# Patient Record
Sex: MALE | Race: WHITE | Employment: OTHER | URBAN - NONMETROPOLITAN AREA
[De-identification: names, ages, dates, MRNs, and addresses within clinical notes are randomized per-mention and may not be internally consistent; named-entity substitution may affect disease eponyms.]

---

## 2024-10-21 ENCOUNTER — TELEPHONE (OUTPATIENT)
Age: 24
End: 2024-10-21

## 2024-10-21 NOTE — TELEPHONE ENCOUNTER
Bertrand Paulino   2000  Lt Wrist fx   Fernando 10/19  Doi 10/19  Xray   Splint and sling   Allyson

## 2024-10-23 DIAGNOSIS — M25.532 PAIN IN LEFT WRIST: Primary | ICD-10-CM

## 2024-10-24 ENCOUNTER — OFFICE VISIT (OUTPATIENT)
Age: 24
End: 2024-10-24

## 2024-10-24 VITALS — BODY MASS INDEX: 33.37 KG/M2 | HEIGHT: 74 IN | WEIGHT: 260 LBS

## 2024-10-24 DIAGNOSIS — S52.572A OTHER CLOSED INTRA-ARTICULAR FRACTURE OF DISTAL END OF LEFT RADIUS, INITIAL ENCOUNTER: Primary | ICD-10-CM

## 2024-10-24 PROBLEM — S52.502A CLOSED FRACTURE OF LEFT DISTAL RADIUS: Status: ACTIVE | Noted: 2024-10-24

## 2024-10-24 RX ORDER — HYDROCODONE BITARTRATE AND ACETAMINOPHEN 5; 325 MG/1; MG/1
TABLET ORAL
COMMUNITY
Start: 2024-10-20

## 2024-10-24 ASSESSMENT — ENCOUNTER SYMPTOMS
BACK PAIN: 0
COLOR CHANGE: 0

## 2024-10-24 NOTE — ASSESSMENT & PLAN NOTE
We will place the patient in a sugar-tong splint overwrapped with fiberglass to limit pronation and supination of the wrist.  We will see him back in 1 week for updated x-rays to ensure that his fracture has not shifted or further displaced.  He is a instructed heavily to not partake in any weight lifting and to limit his activity with this arm.  He does admit that he is going to still try to deer hunt which I advised against.  We will see him back in 1 week.  I did instruct him on proper cast care measures.

## 2024-10-24 NOTE — PROGRESS NOTES
Casting Notes:    Type of cast/splint: Arm , Long Splint Application     Material Used:  1.3 rolls 3 inch cast padding     2.      3.    Fiberglass Cast Tape: 21 sheets 4x15 inch plaster, 2 rolls 3 inch fiberglass cast tape    Reason for Application:     ICD-10-CM    1. Other closed intra-articular fracture of distal end of left radius, initial encounter  S52.572A [place] CAST SUP LONG ARM ADULT FBRG     APPLY LONG ARM CAST      Patient was placed in a sugar tong splint over wrapped with fiberglass with no complications.     Patient was given cast care instructions, and told to call the office with any complaints, or concerns.     Patient was also told to keep their routine follow up appointment.    Derik Cortes MA    
- see patient instructions below.         Electronically signed by PAIGE Myers on 10/24/2024 at 11:15 AM

## 2024-11-04 DIAGNOSIS — S52.572A OTHER CLOSED INTRA-ARTICULAR FRACTURE OF DISTAL END OF LEFT RADIUS, INITIAL ENCOUNTER: Primary | ICD-10-CM

## 2024-12-09 ENCOUNTER — OFFICE VISIT (OUTPATIENT)
Age: 24
End: 2024-12-09
Payer: COMMERCIAL

## 2024-12-09 VITALS — HEIGHT: 74 IN | WEIGHT: 260 LBS | BODY MASS INDEX: 33.37 KG/M2

## 2024-12-09 DIAGNOSIS — S52.572D OTHER CLOSED INTRA-ARTICULAR FRACTURE OF DISTAL END OF LEFT RADIUS WITH ROUTINE HEALING, SUBSEQUENT ENCOUNTER: Primary | ICD-10-CM

## 2024-12-09 PROCEDURE — 99214 OFFICE O/P EST MOD 30 MIN: CPT | Performed by: PHYSICIAN ASSISTANT

## 2024-12-09 PROCEDURE — 29075 APPL CST ELBW FNGR SHORT ARM: CPT | Performed by: PHYSICIAN ASSISTANT

## 2024-12-09 ASSESSMENT — ENCOUNTER SYMPTOMS
BACK PAIN: 0
COLOR CHANGE: 0

## 2024-12-09 NOTE — PROGRESS NOTES
Casting Notes:    Type of cast/splint: Short Leg Cast Application     Material Used:  1. 3 rolls 6 inch cast padding      2.      3.    Fiberglass Cast Tape: 3 rolls 4 inch fiberglass cast tape     Reason for Application:     ICD-10-CM    1. Other closed intra-articular fracture of distal end of left radius with routine healing, subsequent encounter  S52.572D XR WRIST LEFT (MIN 3 VIEWS)     [place] CAST SUP SHT ARM ADULT FBRGL     APPLY FOREARM CAST      Patient was placed in a short leg fiberglass cast with no complications.     Patient was given cast care instructions, and told to call the office with any complaints, or concerns.     Patient was also told to keep their routine follow up appointment.    Derik Cortes MA

## 2024-12-09 NOTE — ASSESSMENT & PLAN NOTE
AP, lateral, oblique x-ray views were obtained today in office of the distal radius fracture with extension into the intra-articular space.  There does appear to be some displacement of the intra-articular extension compared to previous plain film as well as scant interval osseous healing when compared to previous plain films.    I expressed to the patient and his mother that his continued use of the extremity has delayed healing as well as promoted further displacement of the fracture.  I expressed to him he needs to be completely nonweightbearing for the next 2 weeks and we will put him back in a short arm fiberglass cast.  He conveyed his understanding.  I did express to him that further use predisposes him to complications such as posttraumatic arthritis and he did express that he would rather \"have posttraumatic arthritis than quit duck hunting.\"

## 2024-12-09 NOTE — PROGRESS NOTES
Patient was taken out of cast before x-rays. There was no sign of infection or skin breakdown.    Casting Notes:    Type of cast/splint:Arm, Forearm (SAC Ulnar Gutter Cast) Cast Application     Material Used:  1. 2 rolls cast padding      2.      3.    Fiberglass Cast Tape: 2 rolls 2 inch fiberglass cast tape     Reason for Application:     ICD-10-CM    1. Other closed intra-articular fracture of distal end of left radius with routine healing, subsequent encounter  S52.572D XR WRIST LEFT (MIN 3 VIEWS)      Patient was placed In a short arm fiberglass cast with no complications.     Patient was given cast care instructions, and told to call the office with any complaints, or concerns.     Patient was also told to keep their routine follow up appointment.    Derik Cortes MA

## 2024-12-09 NOTE — PROGRESS NOTES
[facility]  [facility]  [address] [place] [address]  Dept: [phone]  Dept Fax: [phone]  Loc: [phone]    Bertrand Paulino is a 24 y.o. male who presents today for his medical conditions/complaints as noted below.  Bertrand Paulino is complaining of Follow-up (Right wrist )        HPI:   Patient is a pleasant 24-year-old male presenting to the clinic today little over 6 weeks out from an injury resulting in a distal radius fracture with extension into the intra-articular space that is nondisplaced.  He was converted to a short arm cast at his previous visit and instructed to limit activity and weightbearing of the extremity.  He admitted at his last visit that he had been using the arm \"probably too much.\"  He is here today and states that he notes no discomfort but does admit that he has again been using the arm frequently for lifting.  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        History reviewed. No pertinent past medical history.    Past Surgical History:   Procedure Laterality Date    NASAL FRACTURE SURGERY         History reviewed. No pertinent family history.    Social History     Tobacco Use    Smoking status: Never    Smokeless tobacco: Current     Types: Snuff   Substance Use Topics    Alcohol use: Never        Current Outpatient Medications   Medication Sig Dispense Refill    HYDROcodone-acetaminophen (NORCO) 5-325 MG per tablet        No current facility-administered medications for this visit.       No Known Allergies    Health Maintenance   Topic Date Due    Varicella vaccine (2 of 2 - 2-dose childhood series) 09/24/2003    DTaP/Tdap/Td vaccine (6 - Tdap) 09/24/2010    Depression Screen  Never done    HPV vaccine (1 - Male 3-dose series) Never done    HIV screen  Never done    Hepatitis C screen  Never done    Flu vaccine (1) Never done    COVID-19 Vaccine (1 -

## 2024-12-19 ENCOUNTER — OFFICE VISIT (OUTPATIENT)
Age: 24
End: 2024-12-19
Payer: COMMERCIAL

## 2024-12-19 VITALS — WEIGHT: 260 LBS | BODY MASS INDEX: 33.37 KG/M2 | HEIGHT: 74 IN

## 2024-12-19 DIAGNOSIS — S52.572G OTHER CLOSED INTRA-ARTICULAR FRACTURE OF DISTAL END OF LEFT RADIUS WITH DELAYED HEALING, SUBSEQUENT ENCOUNTER: Primary | ICD-10-CM

## 2024-12-19 PROCEDURE — 99214 OFFICE O/P EST MOD 30 MIN: CPT | Performed by: PHYSICIAN ASSISTANT

## 2024-12-19 ASSESSMENT — ENCOUNTER SYMPTOMS: COLOR CHANGE: 0

## 2024-12-19 NOTE — ASSESSMENT & PLAN NOTE
Graphic evaluation of the left wrist today in office was reviewed by myself and Dr. Boswell.  This continues to demonstrate the further displaced distal radius fracture with intra-articular extension with again very little interval osseous healing when compared to previous plain films.  However, there is no further displacement.    We will place the patient in a heat molded removable brace for him to wear at all times for subsequent 3 to 4 weeks.  He is again instructed to limit weightbearing to no more than 2 to 3 pounds and wearing the brace at all times except for hygiene purposes.  He can discontinue the use of the brace at that time and if he is interested in hand and occupational therapy he will let us know.  I again expressed to him that if he continues to overutilize the extremity while still in the healing process he is risking formation of posttraumatic arthritis and he conveyed his understanding.  Otherwise, he is to follow-up with us as needed.

## 2024-12-19 NOTE — PROGRESS NOTES
Patient presents today for follow up with PAIGE Myers and x-rays out of cast. The old cast was removed, skin intact and patient sent to x-ray.  
the patient in a heat molded removable brace for him to wear at all times for subsequent 3 to 4 weeks.  He is again instructed to limit weightbearing to no more than 2 to 3 pounds and wearing the brace at all times except for hygiene purposes.  He can discontinue the use of the brace at that time and if he is interested in hand and occupational therapy he will let us know.  I again expressed to him that if he continues to overutilize the extremity while still in the healing process he is risking formation of posttraumatic arthritis and he conveyed his understanding.  Otherwise, he is to follow-up with us as needed.  Orders:  -     XR WRIST LEFT (MIN 3 VIEWS); Future       Plan     New Prescriptions    No medications on file        Return if symptoms worsen or fail to improve.          Discussed use, benefits, and side effects of any prescribed medications. All patient questions were answered. Patient voiced understanding of care plan.   Patient was given educational materials - see patient instructions below.         Electronically signed by PAIGE Myers on 12/19/2024 at 3:11 PM